# Patient Record
Sex: MALE | Race: WHITE | Employment: FULL TIME | URBAN - METROPOLITAN AREA
[De-identification: names, ages, dates, MRNs, and addresses within clinical notes are randomized per-mention and may not be internally consistent; named-entity substitution may affect disease eponyms.]

---

## 2020-12-19 ENCOUNTER — HOSPITAL ENCOUNTER (EMERGENCY)
Age: 35
Discharge: HOME | End: 2020-12-19
Attending: EMERGENCY MEDICINE
Payer: COMMERCIAL

## 2020-12-19 VITALS
TEMPERATURE: 97.4 F | SYSTOLIC BLOOD PRESSURE: 134 MMHG | WEIGHT: 173 LBS | BODY MASS INDEX: 24.22 KG/M2 | HEART RATE: 86 BPM | DIASTOLIC BLOOD PRESSURE: 90 MMHG | OXYGEN SATURATION: 99 % | RESPIRATION RATE: 14 BRPM | HEIGHT: 71 IN

## 2020-12-19 DIAGNOSIS — R20.2 PARESTHESIA OF LEFT LEG: Primary | ICD-10-CM

## 2020-12-19 PROCEDURE — 99282 EMERGENCY DEPT VISIT SF MDM: CPT

## 2020-12-19 RX ORDER — PREDNISONE 20 MG/1
TABLET ORAL
Qty: 20 TAB | Refills: 0 | Status: SHIPPED | OUTPATIENT
Start: 2020-12-19

## 2020-12-19 NOTE — ED PROVIDER NOTES
53-year-old male presents with paresthesias of the left leg that started a yesterday. He states that he has had a bad back and is seen a chiropractor before. He has had pain previously that radiates down his left arm. He today while waiting in line to check for late noticed some numbness in the palm of his left hand. That has resolved. He denies any neck or back pain currently. He denies any difficulty walking or speaking. No change in vision. He denies any headache or nausea or vomiting. 2 weeks ago he had noticed bilateral groin and scrotal tightness after riding an exercise bike. Those symptoms lasted a couple days. He denies any fever, chills, prior history of IV drug abuse. He has no problems with his bowel or bladder. Leg Pain          Past Medical History:   Diagnosis Date    H/O back injury \"many years ago\"       History reviewed. No pertinent surgical history. History reviewed. No pertinent family history.     Social History     Socioeconomic History    Marital status:      Spouse name: Not on file    Number of children: Not on file    Years of education: Not on file    Highest education level: Not on file   Occupational History    Not on file   Social Needs    Financial resource strain: Not on file    Food insecurity     Worry: Not on file     Inability: Not on file    Transportation needs     Medical: Not on file     Non-medical: Not on file   Tobacco Use    Smoking status: Never Smoker    Smokeless tobacco: Never Used   Substance and Sexual Activity    Alcohol use: Not Currently    Drug use: Not on file    Sexual activity: Not on file   Lifestyle    Physical activity     Days per week: Not on file     Minutes per session: Not on file    Stress: Not on file   Relationships    Social connections     Talks on phone: Not on file     Gets together: Not on file     Attends Spiritism service: Not on file     Active member of club or organization: Not on file Attends meetings of clubs or organizations: Not on file     Relationship status: Not on file    Intimate partner violence     Fear of current or ex partner: Not on file     Emotionally abused: Not on file     Physically abused: Not on file     Forced sexual activity: Not on file   Other Topics Concern    Not on file   Social History Narrative    Not on file         ALLERGIES: Patient has no known allergies. Review of Systems   All other systems reviewed and are negative. Vitals:    12/19/20 1500   BP: (!) 134/90   Pulse: 86   Resp: 14   Temp: 97.4 °F (36.3 °C)   SpO2: 99%   Weight: 78.5 kg (173 lb)   Height: 5' 11\" (1.803 m)            Physical Exam  Vitals signs and nursing note reviewed. Constitutional:       General: He is not in acute distress. HENT:      Head: Normocephalic and atraumatic. Eyes:      General: No scleral icterus. Conjunctiva/sclera: Conjunctivae normal.      Pupils: Pupils are equal, round, and reactive to light. Neck:      Musculoskeletal: No neck rigidity. Trachea: No tracheal deviation. Cardiovascular:      Rate and Rhythm: Normal rate and regular rhythm. Pulses: Normal pulses. Comments: DP pulses 2+ and equal  Pulmonary:      Effort: Pulmonary effort is normal. No respiratory distress. Breath sounds: Normal breath sounds. No stridor. Abdominal:      General: There is no distension. Palpations: Abdomen is soft. Tenderness: There is no abdominal tenderness. Genitourinary:     Comments: deferred  Musculoskeletal:         General: No deformity. Skin:     General: Skin is warm and dry. Neurological:      General: No focal deficit present. Mental Status: He is alert. Comments: Normal motor and sensation throughout. Psychiatric:         Mood and Affect: Mood normal.         Behavior: Behavior normal.          [facility]       Procedures      0164  Patient re-evaluated. All questions answered. Patient appropriate for discharge. Given return precautions and follow up instructions. LABORATORY TESTS:  Labs Reviewed - No data to display    IMAGING RESULTS:  No orders to display       MEDICATIONS GIVEN:  Medications - No data to display    IMPRESSION:  1. Paresthesia of left leg        PLAN:  1. Discharge Medication List as of 12/19/2020  3:43 PM        2. Follow-up Information     Follow up With Specialties Details Why Contact Info    Yancy Wright MD Neurology  As needed for continued numbness [address] [address]  [phone]      [address] [place] [address] DEPT Emergency Medicine  If symptoms worsen or new concerns [facility] [address] [facility]  [phone]        3. Return to ED for new or worsening symptoms       Dana Batista.  Yrn Hallman MD

## 2020-12-19 NOTE — ED TRIAGE NOTES
2 weeks ago patient noticed bilateral groin and scrotal tightness after riding an exercise bike; it lasted [place] couple of days. \" \"When I sit down my legs bother me the most.\" \"I don't know if that is related to my symptoms from last night. \"  Last night at 8 pm, after driving, patient noticed left lower leg and foot \"pins and needles sensation. \" He was able to sleep through the night and once awake this morning had the same sensation that went away. 1 hour ago he noticed left hand and wrist warm sensation that has gone away now. No headache and no nausea or vomiting.  Patient did get a Flu shot yesterday

## 2020-12-19 NOTE — ED NOTES
The patient was discharged home by Dr Kary Prabhakar in stable condition. The patient is alert and oriented, in no respiratory distress. The patient's diagnosis, condition and treatment were explained. The patient expressed understanding. A discharge plan has been developed. A  was not involved in the process. Aftercare instructions were given. Pt ambulatory out of the ED.

## 2023-05-15 RX ORDER — PREDNISONE 20 MG/1
TABLET ORAL
COMMUNITY
Start: 2020-12-19